# Patient Record
(demographics unavailable — no encounter records)

---

## 2024-11-26 NOTE — HISTORY OF PRESENT ILLNESS
[FreeTextEntry8] : Patient presents to office complaining of a several day history of cough, postnasal drip with occasional yellow-green sputum. He states he was ill with similar symptoms approximately 3 weeks ago and was evaluated at an urgent care center where he was prescribed doxycycline without much improvement.  He does state that his daughter was ill with similar symptoms 3 weeks ago. He denies any chest pain, shortness of breath, fever chills night sweats.

## 2024-11-26 NOTE — PHYSICAL EXAM
[No Acute Distress] : no acute distress [Normal Sclera/Conjunctiva] : normal sclera/conjunctiva [Normal TMs] : both tympanic membranes were normal [No Lymphadenopathy] : no lymphadenopathy [No Edema] : there was no peripheral edema [Normal] : soft, non-tender, non-distended, no masses palpated, no HSM and normal bowel sounds [Normal Supraclavicular Nodes] : no supraclavicular lymphadenopathy [Normal Posterior Cervical Nodes] : no posterior cervical lymphadenopathy [Normal Anterior Cervical Nodes] : no anterior cervical lymphadenopathy [de-identified] : Positive postpharyngeal drip, slight erythema nasal mucosa

## 2024-11-26 NOTE — REVIEW OF SYSTEMS
[Negative] : Gastrointestinal [Shortness Of Breath] : no shortness of breath [Wheezing] : no wheezing

## 2024-12-13 NOTE — REVIEW OF SYSTEMS
[Negative] : Gastrointestinal [Shortness Of Breath] : no shortness of breath [Wheezing] : no wheezing [Dyspnea on Exertion] : not dyspnea on exertion

## 2024-12-13 NOTE — HISTORY OF PRESENT ILLNESS
[FreeTextEntry1] : Complaint of cough [de-identified] : Patient presents to office complaining of a persistent cough which is occasionally productive of sputum. He denies any fever, chills, night sweats, chest pain or shortness of breath. He was last seen on November 26 with similar complaints and was prescribed Z-Dru which he completed. He denies any recent history of travel.  He has seen pulmonary also

## 2024-12-13 NOTE — PHYSICAL EXAM
[No Acute Distress] : no acute distress [Normal Sclera/Conjunctiva] : normal sclera/conjunctiva [No Lymphadenopathy] : no lymphadenopathy [Thyroid Normal, No Nodules] : the thyroid was normal and there were no nodules present [No Accessory Muscle Use] : no accessory muscle use [No Carotid Bruits] : no carotid bruits [No Abdominal Bruit] : a ~M bruit was not heard ~T in the abdomen [No Edema] : there was no peripheral edema [Normal] : soft, non-tender, non-distended, no masses palpated, no HSM and normal bowel sounds [Normal Supraclavicular Nodes] : no supraclavicular lymphadenopathy [Normal Posterior Cervical Nodes] : no posterior cervical lymphadenopathy [Normal Anterior Cervical Nodes] : no anterior cervical lymphadenopathy [No CVA Tenderness] : no CVA  tenderness [de-identified] : Bilateral rales at the bases, no dullness to percussion